# Patient Record
Sex: MALE | Race: WHITE | NOT HISPANIC OR LATINO | Employment: OTHER | URBAN - METROPOLITAN AREA
[De-identification: names, ages, dates, MRNs, and addresses within clinical notes are randomized per-mention and may not be internally consistent; named-entity substitution may affect disease eponyms.]

---

## 2017-04-04 ENCOUNTER — ALLSCRIPTS OFFICE VISIT (OUTPATIENT)
Dept: OTHER | Facility: OTHER | Age: 77
End: 2017-04-04

## 2018-01-12 NOTE — MISCELLANEOUS
Provider Comments  Provider Comments:   patient no showed to his appointment today  Signatures   Electronically signed by : Juna R Bowen DO;  Apr 4 2017  9:59AM EST                       (Author)